# Patient Record
Sex: MALE | Race: WHITE | URBAN - METROPOLITAN AREA
[De-identification: names, ages, dates, MRNs, and addresses within clinical notes are randomized per-mention and may not be internally consistent; named-entity substitution may affect disease eponyms.]

---

## 2017-03-21 DIAGNOSIS — I10 ESSENTIAL HYPERTENSION: ICD-10-CM

## 2017-03-21 RX ORDER — LISINOPRIL 10 MG/1
10 TABLET ORAL DAILY
Qty: 90 TABLET | Refills: 0 | Status: SHIPPED | OUTPATIENT
Start: 2017-03-21 | End: 2017-06-16

## 2017-03-21 NOTE — TELEPHONE ENCOUNTER
lisinopril (PRINIVIL,ZESTRIL) 10 MG tablet      Last Written Prescription Date: 02/11/16  Last Fill Quantity: 90 tab, # refills: prn  Last Office Visit with G, P or [facility] prescribing provider: 06/27/16       Potassium   Date Value Ref Range Status   04/20/2015 4.0 3.4 - 5.3 mmol/L Final     Creatinine   Date Value Ref Range Status   06/27/2016 1.24 0.66 - 1.25 mg/dL Final     BP Readings from Last 3 Encounters:   07/20/16 132/74   06/27/16 120/68   02/11/16 132/70

## 2017-06-16 DIAGNOSIS — I10 ESSENTIAL HYPERTENSION: ICD-10-CM

## 2017-06-19 RX ORDER — LISINOPRIL 10 MG/1
TABLET ORAL
Qty: 30 TABLET | Refills: 0 | Status: SHIPPED | OUTPATIENT
Start: 2017-06-19 | End: 2017-06-30

## 2017-06-19 NOTE — TELEPHONE ENCOUNTER
Lisinopril       Last Written Prescription Date: 3/21/17  Last Fill Quantity: 90, # refills: 0  Last Office Visit with G, P or [facility] prescribing provider: 6/27/16  Next 5 appointments (look out 90 days)     Jun 30, 2017  8:30 AM CDT   PHYSICAL with Montana Silver MD   [facility]    [address] [place] [address] [phone]                   Potassium   Date Value Ref Range Status   04/20/2015 4.0 3.4 - 5.3 mmol/L Final     Creatinine   Date Value Ref Range Status   06/27/2016 1.24 0.66 - 1.25 mg/dL Final     BP Readings from Last 3 Encounters:   07/20/16 132/74   06/27/16 120/68   02/11/16 132/70

## 2017-06-30 ENCOUNTER — OFFICE VISIT (OUTPATIENT)
Dept: INTERNAL MEDICINE | Facility: CLINIC | Age: 77
End: 2017-06-30
Payer: COMMERCIAL

## 2017-06-30 VITALS
WEIGHT: 212 LBS | OXYGEN SATURATION: 95 % | DIASTOLIC BLOOD PRESSURE: 70 MMHG | BODY MASS INDEX: 30.35 KG/M2 | HEART RATE: 72 BPM | RESPIRATION RATE: 16 BRPM | HEIGHT: 70 IN | SYSTOLIC BLOOD PRESSURE: 124 MMHG | TEMPERATURE: 98.3 F

## 2017-06-30 DIAGNOSIS — E66.9 NON MORBID OBESITY, UNSPECIFIED OBESITY TYPE: ICD-10-CM

## 2017-06-30 DIAGNOSIS — J30.9 ALLERGIC RHINITIS, UNSPECIFIED CHRONICITY, UNSPECIFIED SEASONALITY, UNSPECIFIED TRIGGER: ICD-10-CM

## 2017-06-30 DIAGNOSIS — Z00.00 MEDICARE ANNUAL WELLNESS VISIT, SUBSEQUENT: Primary | ICD-10-CM

## 2017-06-30 DIAGNOSIS — E78.5 HYPERLIPIDEMIA LDL GOAL <130: ICD-10-CM

## 2017-06-30 DIAGNOSIS — I10 ESSENTIAL HYPERTENSION: ICD-10-CM

## 2017-06-30 LAB
ANION GAP SERPL CALCULATED.3IONS-SCNC: 7 MMOL/L (ref 3–14)
BUN SERPL-MCNC: 15 MG/DL (ref 7–30)
CALCIUM SERPL-MCNC: 9.5 MG/DL (ref 8.5–10.1)
CHLORIDE SERPL-SCNC: 110 MMOL/L (ref 94–109)
CHOLEST SERPL-MCNC: 174 MG/DL
CO2 SERPL-SCNC: 25 MMOL/L (ref 20–32)
CREAT SERPL-MCNC: 1.23 MG/DL (ref 0.66–1.25)
GFR SERPL CREATININE-BSD FRML MDRD: 57 ML/MIN/1.7M2
GLUCOSE SERPL-MCNC: 99 MG/DL (ref 70–99)
HDLC SERPL-MCNC: 42 MG/DL
LDLC SERPL CALC-MCNC: 113 MG/DL
NONHDLC SERPL-MCNC: 132 MG/DL
POTASSIUM SERPL-SCNC: 4.2 MMOL/L (ref 3.4–5.3)
SODIUM SERPL-SCNC: 142 MMOL/L (ref 133–144)
TRIGL SERPL-MCNC: 97 MG/DL

## 2017-06-30 PROCEDURE — 80061 LIPID PANEL: CPT | Performed by: INTERNAL MEDICINE

## 2017-06-30 PROCEDURE — 80048 BASIC METABOLIC PNL TOTAL CA: CPT | Performed by: INTERNAL MEDICINE

## 2017-06-30 PROCEDURE — 99397 PER PM REEVAL EST PAT 65+ YR: CPT | Performed by: INTERNAL MEDICINE

## 2017-06-30 PROCEDURE — 36415 COLL VENOUS BLD VENIPUNCTURE: CPT | Performed by: INTERNAL MEDICINE

## 2017-06-30 RX ORDER — FLUTICASONE PROPIONATE 50 MCG
2 SPRAY, SUSPENSION (ML) NASAL DAILY PRN
Qty: 16 G | Status: SHIPPED | OUTPATIENT
Start: 2017-06-30 | End: 2018-07-13

## 2017-06-30 RX ORDER — LISINOPRIL 10 MG/1
TABLET ORAL
Qty: 90 TABLET | Refills: 3 | Status: SHIPPED | OUTPATIENT
Start: 2017-06-30 | End: 2018-07-11

## 2017-06-30 NOTE — PATIENT INSTRUCTIONS
Preventive Health Recommendations:       Male Ages 65 and over    Yearly exam:             See your health care provider every year in order to  o   Review health changes.   o   Discuss preventive care.    o   Review your medicines if your doctor has prescribed any.    Talk with your health care provider about whether you should have a test to screen for prostate cancer (PSA).    Every 3 years, have a diabetes test (fasting glucose). If you are at risk for diabetes, you should have this test more often.    Every 5 years, have a cholesterol test. Have this test more often if you are at risk for high cholesterol or heart disease.     Every 10 years, have a colonoscopy. Or, have a yearly FIT test (stool test). These exams will check for colon cancer.    Talk to with your health care provider about screening for Abdominal Aortic Aneurysm if you have a family history of AAA or have a history of smoking.  Shots:     Get a flu shot each year.     Get a tetanus shot every 10 years.     Talk to your doctor about your pneumonia vaccines. There are now two you should receive - Pneumovax (PPSV 23) and Prevnar (PCV 13).    Talk to your doctor about a shingles vaccine.     Talk to your doctor about the hepatitis B vaccine.  Nutrition:     Eat at least 5 servings of fruits and vegetables each day.     Eat whole-grain bread, whole-wheat pasta and brown rice instead of white grains and rice.     Talk to your doctor about Calcium and Vitamin D.   Lifestyle    Exercise for at least 150 minutes a week (30 minutes a day, 5 days a week). This will help you control your weight and prevent disease.     Limit alcohol to one drink per day.     No smoking.     Wear sunscreen to prevent skin cancer.     See your dentist every six months for an exam and cleaning.     See your eye doctor every 1 to 2 years to screen for conditions such as glaucoma, macular degeneration and cataracts.    PLAN:  1.  Continue present medications   2.  Continue  weight management, blood pressure checks  3.  Check labs

## 2017-06-30 NOTE — MR AVS SNAPSHOT
After Visit Summary   6/30/2017    Nirav Queen    MRN: [id number]           Patient Information     Date Of Birth          1940        Visit Information        Provider Department      6/30/2017 8:30 AM Montana Silver MD [facility]        Today's Diagnoses     Medicare annual wellness visit, subsequent    -  1    Essential hypertension        Allergic rhinitis, unspecified chronicity, unspecified seasonality, unspecified trigger        Hyperlipidemia LDL goal <130        Obesity          Care Instructions      Preventive Health Recommendations:       Male Ages 65 and over    Yearly exam:             See your health care provider every year in order to  o   Review health changes.   o   Discuss preventive care.    o   Review your medicines if your doctor has prescribed any.    Talk with your health care provider about whether you should have a test to screen for prostate cancer (PSA).    Every 3 years, have a diabetes test (fasting glucose). If you are at risk for diabetes, you should have this test more often.    Every 5 years, have a cholesterol test. Have this test more often if you are at risk for high cholesterol or heart disease.     Every 10 years, have a colonoscopy. Or, have a yearly FIT test (stool test). These exams will check for colon cancer.    Talk to with your health care provider about screening for Abdominal Aortic Aneurysm if you have a family history of AAA or have a history of smoking.  Shots:     Get a flu shot each year.     Get a tetanus shot every 10 years.     Talk to your doctor about your pneumonia vaccines. There are now two you should receive - Pneumovax (PPSV 23) and Prevnar (PCV 13).    Talk to your doctor about a shingles vaccine.     Talk to your doctor about the hepatitis B vaccine.  Nutrition:     Eat at least 5 servings of fruits and vegetables each day.     Eat whole-grain bread, whole-wheat pasta and brown rice instead of white  grains and rice.     Talk to your doctor about Calcium and Vitamin D.   Lifestyle    Exercise for at least 150 minutes a week (30 minutes a day, 5 days a week). This will help you control your weight and prevent disease.     Limit alcohol to one drink per day.     No smoking.     Wear sunscreen to prevent skin cancer.     See your dentist every six months for an exam and cleaning.     See your eye doctor every 1 to 2 years to screen for conditions such as glaucoma, macular degeneration and cataracts.    PLAN:  1.  Continue present medications   2.  Continue weight management, blood pressure checks  3.  Check labs           Follow-ups after your visit        Who to contact     If you have questions or need follow up information about today's clinic visit or your schedule please contact [facility] directly at [phone].  Normal or non-critical lab and imaging results will be communicated to you by MyChart, letter or phone within 4 business days after the clinic has received the results. If you do not hear from us within 7 days, please contact the clinic through [employer] or phone. If you have a critical or abnormal lab result, we will notify you by phone as soon as possible.  Submit refill requests through [employer] or call your pharmacy and they will forward the refill request to us. Please allow 3 business days for your refill to be completed.          Additional Information About Your Visit        [employer] Information     [employer] gives you secure access to your electronic health record. If you see a primary care provider, you can also send messages to your care team and make appointments. If you have questions, please call your primary care clinic.  If you do not have a primary care provider, please call [phone] and they will assist you.        Care EveryWhere ID     This is your Care EveryWhere ID. This could be used by other organizations to access your [facility]  "records  [id number]        Your Vitals Were     Pulse Temperature Respirations Height Pulse Oximetry BMI (Body Mass Index)    72 98.3  F (36.8  C) (Oral) 16 5' 10\" (1.778 m) 95% 30.42 kg/m2       Blood Pressure from Last 3 Encounters:   06/30/17 124/70   07/20/16 132/74   06/27/16 120/68    Weight from Last 3 Encounters:   06/30/17 212 lb (96.2 kg)   07/20/16 212 lb 4.8 oz (96.3 kg)   06/27/16 211 lb 12.8 oz (96.1 kg)              We Performed the Following     Basic metabolic panel     Lipid panel reflex to direct LDL          Today's Medication Changes          These changes are accurate as of: 6/30/17  9:39 AM.  If you have any questions, ask your nurse or doctor.               These medicines have changed or have updated prescriptions.        Dose/Directions    fluticasone 50 MCG/ACT spray   Commonly known as:  FLONASE   This may have changed:    - when to take this  - reasons to take this   Used for:  Allergic rhinitis, unspecified chronicity, unspecified seasonality, unspecified trigger   Changed by:  Montana Silver MD        Dose:  2 spray   Spray 2 sprays into both nostrils daily as needed for rhinitis or allergies   Quantity:  16 g   Refills:  prn       lisinopril 10 MG tablet   Commonly known as:  PRINIVIL/ZESTRIL   This may have changed:  See the new instructions.   Used for:  Essential hypertension   Changed by:  Montana Silver MD        TAKE 1 TABLET(10 MG) BY MOUTH DAILY   Quantity:  90 tablet   Refills:  3            Where to get your medicines      These medications were sent to [facility] [address] [facility] [address] AT [facility] [address], [facility] [address]    Hours:  24-hours Phone:  [phone]     fluticasone 50 MCG/ACT spray    lisinopril 10 MG tablet                Primary Care Provider Office Phone # Fax #    Montana Silver [phone]       [facility] [address] [facility] [address]      "   Equal Access to Services     [facility] Hospital[facility]AMER[facility] : Hadii ramesh henry chelo [place], wamadisynda luqadaha, qaybta kajamarbenito saenz, pepe garcia. So [facility] [phone].    ATENCIÓN: Si santosla farheen, tiene a comer disposición servicios gratuitos de asistencia lingüística. Bellaame al [phone].    We comply with applicable federal civil rights laws and [place] laws. We do not discriminate on the basis of race, color, national origin, age, disability sex, sexual orientation or gender identity.            Thank you!     Thank you for choosing [facility]  for your care. Our goal is always to provide you with excellent care. Hearing back from our patients is one way we can continue to improve our services. Please take a few minutes to complete the written survey that you may receive in the mail after your visit with us. Thank you!             Your Updated Medication List - Protect others around you: Learn how to safely use, store and throw away your medicines at www.disposemymeds.org.          This list is accurate as of: 6/30/17  9:39 AM.  Always use your most recent med list.                   Brand Name Dispense Instructions for use Diagnosis    aspirin 81 MG tablet      Take 1 tablet by mouth daily.        cyabnocobalamin 2500 MCG sublingual tablet    VITAMIN B-12    90 tablet    Place 2,500 mcg under the tongue daily    Paresthesias       fluticasone 50 MCG/ACT spray    FLONASE    16 g    Spray 2 sprays into both nostrils daily as needed for rhinitis or allergies    Allergic rhinitis, unspecified chronicity, unspecified seasonality, unspecified trigger       lisinopril 10 MG tablet    PRINIVIL/ZESTRIL    90 tablet    TAKE 1 TABLET(10 MG) BY MOUTH DAILY    Essential hypertension       METROGEL 1 % gel   Generic drug:  metroNIDAZOLE     1 Tube    Apply twice daily to affected areas for rosacea    Rosacea

## 2017-06-30 NOTE — NURSING NOTE
"Chief Complaint   Patient presents with     Physical       Initial /70  Pulse 72  Temp 98.3  F (36.8  C) (Oral)  Resp 16  Ht 5' 10\" (1.778 m)  Wt 212 lb (96.2 kg)  SpO2 95%  BMI 30.42 kg/m2 Estimated body mass index is 30.42 kg/(m^2) as calculated from the following:    Height as of this encounter: 5' 10\" (1.778 m).    Weight as of this encounter: 212 lb (96.2 kg).  Medication Reconciliation: complete   Janette Ricci MA   "

## 2017-06-30 NOTE — PROGRESS NOTES
SUBJECTIVE:   Nirav Queen is a 77 year old male who presents for Preventive Visit.    Are you in the first 12 months of your Medicare Part B coverage?  No    Healthy Habits:    Do you get at least three servings of calcium containing foods daily (dairy, green leafy vegetables, etc.)? No, only 2     Amount of exercise or daily activities, outside of work: 5 day(s) per week    Problems taking medications regularly No    Medication side effects: No    Have you had an eye exam in the past two years? yes    Do you see a dentist twice per year? yes    Do you have sleep apnea, excessive snoring or daytime drowsiness?no    COGNITIVE SCREEN  1) Repeat 3 items (Banana, Sunrise, Chair)    2) Clock draw: NORMAL  3) 3 item recall: Recalls 3 objects  Results: 3 items recalled: COGNITIVE IMPAIRMENT LESS LIKELY    Mini-CogTM Copyright S Mason. Licensed by the author for use in [facility] [employer]; reprinted with permission (darlyn@[facility]). All rights reserved.      Additional issues to address:  1.  Follow-up HTN.  Patient is not checking outpatient blood pressures often.  He reports no side effects from BP medications.   2.  Paresthesias in feet are absent, continues on B12       Reviewed and updated as needed this visit by clinical staff         Reviewed and updated as needed this visit by Provider        Social History   Substance Use Topics     Smoking status: Never Smoker     Smokeless tobacco: Never Used     Alcohol use Yes      Comment: wine-- 1-2 glasses a week or less       The patient does not drink >3 drinks per day nor >7 drinks per week.    Today's PHQ-2 Score:   PHQ-2 ( 1999 Pfizer) 6/27/2016 6/24/2016   Q1: Little interest or pleasure in doing things 0 -   Q2: Feeling down, depressed or hopeless 0 -   PHQ-2 Score 0 -   Q1: Little interest or pleasure in doing things - Not at all   Q2: Feeling down, depressed or hopeless - Not at all   PHQ-2 Score - 0       Do you feel safe in your environment - Yes    Do  "you have a Health Care Directive?: No: Advance care planning reviewed with patient; information given to patient to review.    Current providers sharing in care for this patient include:   Patient Care Team:  Montana Silver MD as PCP - General      Hearing impairment: Yes, Need to ask people to speak up or repeat themselves.    Ability to successfully perform activities of daily living: Yes, no assistance needed     Fall risk:  Fallen 2 or more times in the past year?: No  Any fall with injury in the past year?: No    Home safety:  none identified      The following health maintenance items are reviewed in Epic and correct as of today:  Health Maintenance   Topic Date Due     AORTIC ANEURYSM SCREENING (SYSTEM ASSIGNED)  04/05/2007     LIPID MONITORING Q6 MO  10/20/2015     CREATININE Q1 YEAR  06/27/2017     FALL RISK ASSESSMENT  06/27/2017     INFLUENZA VACCINE (SYSTEM ASSIGNED)  09/01/2017     COLONOSCOPY Q3 YR  05/13/2018     ADVANCE DIRECTIVE PLANNING Q5 YRS  04/07/2019     TETANUS IMMUNIZATION (SYSTEM ASSIGNED)  06/27/2026     PNEUMOCOCCAL  Completed       ROS:  Constitutional, HEENT, cardiovascular, pulmonary, gi and gu systems are negative, except as otherwise noted.   Erections are not as strong.  Can have mild orthostatic symptoms after bending over.       OBJECTIVE:   /70  Pulse 72  Temp 98.3  F (36.8  C) (Oral)  Resp 16  Ht 5' 10\" (1.778 m)  Wt 212 lb (96.2 kg)  SpO2 95%  BMI 30.42 kg/m2 Estimated body mass index is 30.46 kg/(m^2) as calculated from the following:    Height as of 6/27/16: 5' 10\" (1.778 m).    Weight as of 7/20/16: 212 lb 4.8 oz (96.3 kg).  EXAM:   GENERAL: healthy, alert and no distress  NECK: no adenopathy, no asymmetry, masses, or scars and thyroid normal to palpation  RESP: lungs clear to auscultation - no rales, rhonchi or wheezes  CV: regular rate and rhythm, normal S1 S2, no S3 or S4, no murmur, click or rub, no peripheral edema and peripheral pulses strong  ABDOMEN: " "soft, nontender, no hepatosplenomegaly, no masses and bowel sounds normal  MS: no gross musculoskeletal defects noted, no edema  SKIN: mild rosacea nose    ASSESSMENT / PLAN:     ASSESSMENT:   1.  Annual Wellness Visit  2.  HTN, controlled.   Discussed possibility of cutting lisinopril to 5 mg daily, and checking blood pressure more often  3.  Follow-up hyperlipidemia, diet control      PLAN:  1.  Continue present medications   2.  Continue weight management, blood pressure checks  3.  Check labs       End of Life Planning:  Patient currently has an advanced directive: Yes.  Practitioner is supportive of decision.    COUNSELING:  Reviewed preventive health counseling, as reflected in patient instructions      Estimated body mass index is 30.46 kg/(m^2) as calculated from the following:    Height as of 6/27/16: 5' 10\" (1.778 m).    Weight as of 7/20/16: 212 lb 4.8 oz (96.3 kg).  Weight management plan: Discussed healthy diet and exercise guidelines and patient will follow up in 12 months in clinic to re-evaluate.   reports that he has never smoked. He has never used smokeless tobacco.      Appropriate preventive services were discussed with this patient, including applicable screening as appropriate for cardiovascular disease, diabetes, osteopenia/osteoporosis, and glaucoma.  As appropriate for age/gender, discussed screening for colorectal cancer, prostate cancer, breast cancer, and cervical cancer. Checklist reviewing preventive services available has been given to the patient.    Reviewed patients plan of care and provided an AVS. The Basic Care Plan (routine screening as documented in Health Maintenance) for Nirav meets the Care Plan requirement. This Care Plan has been established and reviewed with the Patient.    Counseling Resources:  ATP IV Guidelines  Pooled Cohorts Equation Calculator  Breast Cancer Risk Calculator  FRAX Risk Assessment  ICSI Preventive Guidelines  Dietary Guidelines for Americans, " 2010  USDA's MyPlate  ASA Prophylaxis  Lung CA Screening    Montana Silver MD  [facility]

## 2017-07-16 DIAGNOSIS — I10 ESSENTIAL HYPERTENSION: ICD-10-CM

## 2017-07-17 NOTE — TELEPHONE ENCOUNTER
lisinopril (PRINIVIL/ZESTRIL) 10 MG tablet      Last Written Prescription Date: 6/30/17  Last Fill Quantity: 90, # refills: 3  Last Office Visit with G, P or [facility] prescribing provider: 6/30/17       Potassium   Date Value Ref Range Status   06/30/2017 4.2 3.4 - 5.3 mmol/L Final     Creatinine   Date Value Ref Range Status   06/30/2017 1.23 0.66 - 1.25 mg/dL Final     BP Readings from Last 3 Encounters:   06/30/17 124/70   07/20/16 132/74   06/27/16 120/68

## 2017-07-18 RX ORDER — LISINOPRIL 10 MG/1
TABLET ORAL
Qty: 30 TABLET | Refills: 0 | OUTPATIENT
Start: 2017-07-18

## 2017-08-06 DIAGNOSIS — J30.9 ALLERGIC RHINITIS: ICD-10-CM

## 2017-08-07 RX ORDER — FLUTICASONE PROPIONATE 50 MCG
SPRAY, SUSPENSION (ML) NASAL
Qty: 16 ML | Refills: 0 | OUTPATIENT
Start: 2017-08-07

## 2017-08-10 DIAGNOSIS — R20.2 PARESTHESIAS: ICD-10-CM

## 2018-05-12 DIAGNOSIS — R20.2 PARESTHESIAS: ICD-10-CM

## 2018-05-13 NOTE — TELEPHONE ENCOUNTER
"Requested Prescriptions   Pending Prescriptions Disp Refills     cyanocobalamin 2500 MCG SUBL sublingual tablet [Pharmacy Med Name: VITAMIN B-12 2500MCG SUBL TABS 50'S]  Last Written Prescription Date:  08/10/2017  Last Fill Quantity: 90,  # refills: 2   Last office visit: 6/30/2017 with prescribing provider:  KASHMIR    Future Office Visit:   Next 5 appointments (look out 90 days)     Jul 13, 2018  9:00 AM CDT   PHYSICAL with Montana Silver MD   [facility]    [address] [place] [address] [phone]                  90 tablet 0     Sig: PLACE 1 TABLET UNDER THE TONGUE DAILY    Vitamin Supplements (Adult) Protocol Passed    5/12/2018  3:13 AM       Passed - High dose Vitamin D not ordered       Passed - Recent (12 mo) or future (30 days) visit within the authorizing provider's specialty    Patient had office visit in the last 12 months or has a visit in the next 30 days with authorizing provider or within the authorizing provider's specialty.  See \"Patient Info\" tab in inbasket, or \"Choose Columns\" in Meds & Orders section of the refill encounter.              "

## 2018-07-10 ASSESSMENT — ACTIVITIES OF DAILY LIVING (ADL)
CURRENT_FUNCTION: NO ASSISTANCE NEEDED
I_NEED_ASSISTANCE_FOR_THE_FOLLOWING_DAILY_ACTIVITIES:: NO ASSISTANCE IS NEEDED

## 2018-07-11 DIAGNOSIS — I10 ESSENTIAL HYPERTENSION: ICD-10-CM

## 2018-07-11 RX ORDER — LISINOPRIL 10 MG/1
TABLET ORAL
Qty: 90 TABLET | Refills: 0 | OUTPATIENT
Start: 2018-07-11

## 2018-07-11 RX ORDER — LISINOPRIL 10 MG/1
TABLET ORAL
Qty: 30 TABLET | Refills: 0 | Status: SHIPPED | OUTPATIENT
Start: 2018-07-11 | End: 2018-07-13

## 2018-07-11 NOTE — LETTER
[facility]  [address] [id number] [phone]            Nirav Queen  [address]  [facility] [address]        July 11, 2018    Dear Nirav,    While refilling your prescription today, we noticed that you are due for an appointment with your provider.  We will refill your prescription for 30 days, but a follow-up appointment must be made before any additional refills can be approved.     Taking care of your health is important to us and we look forward to seeing you in the near future.  Please call us at [phone] or [phone] (or use [employer]) to schedule an appointment.     Please disregard this notice if you have already made an appointment.    Sincerely,        [facility]

## 2018-07-11 NOTE — TELEPHONE ENCOUNTER
"Requested Prescriptions   Pending Prescriptions Disp Refills     lisinopril (PRINIVIL/ZESTRIL) 10 MG tablet 90 tablet 3     Sig: TAKE 1 TABLET(10 MG) BY MOUTH DAILY    ACE Inhibitors (Including Combos) Protocol Failed    7/11/2018 10:42 AM       Failed - Blood pressure under 140/90 in past 12 months    BP Readings from Last 3 Encounters:   06/30/17 124/70   07/20/16 132/74   06/27/16 120/68                Failed - Normal serum creatinine on file in past 12 months    Recent Labs   Lab Test  06/30/17   0949   CR  1.23            Failed - Normal serum potassium on file in past 12 months    Recent Labs   Lab Test  06/30/17   0949   POTASSIUM  4.2            Passed - Recent (12 mo) or future (30 days) visit within the authorizing provider's specialty    Patient had office visit in the last 12 months or has a visit in the next 30 days with authorizing provider or within the authorizing provider's specialty.  See \"Patient Info\" tab in inbasket, or \"Choose Columns\" in Meds & Orders section of the refill encounter.           Passed - Patient is age 18 or older        Prescription approved per [facility] Refill Protocol.    "

## 2018-07-11 NOTE — TELEPHONE ENCOUNTER
Requested Prescriptions   Pending Prescriptions Disp Refills     lisinopril (PRINIVIL/ZESTRIL) 10 MG tablet 90 tablet 3     Sig: TAKE 1 TABLET(10 MG) BY MOUTH DAILY    There is no refill protocol information for this order        Last Written Prescription Date:  6/30/17  Last Fill Quantity: 90,  # refills: 3   Last office visit: 6/30/2017 with prescribing provider:  6/30/17   Future Office Visit:   Next 5 appointments (look out 90 days)     Jul 13, 2018  9:00 AM CDT   PHYSICAL with Montana Silver MD   [facility]    [address] [address] [phone]

## 2018-07-11 NOTE — TELEPHONE ENCOUNTER
"Requested Prescriptions   Pending Prescriptions Disp Refills     lisinopril (PRINIVIL/ZESTRIL) 10 MG tablet [Pharmacy Med Name: LISINOPRIL 10MG TABLETS] 90 tablet 0     Sig: TAKE 1 TABLET BY MOUTH EVERY DAY    ACE Inhibitors (Including Combos) Protocol Failed    7/11/2018 11:40 AM       Failed - Blood pressure under 140/90 in past 12 months    BP Readings from Last 3 Encounters:   06/30/17 124/70   07/20/16 132/74   06/27/16 120/68                Failed - Normal serum creatinine on file in past 12 months    Recent Labs   Lab Test  06/30/17   0949   CR  1.23            Failed - Normal serum potassium on file in past 12 months    Recent Labs   Lab Test  06/30/17   0949   POTASSIUM  4.2            Passed - Recent (12 mo) or future (30 days) visit within the authorizing provider's specialty    Patient had office visit in the last 12 months or has a visit in the next 30 days with authorizing provider or within the authorizing provider's specialty.  See \"Patient Info\" tab in inbasket, or \"Choose Columns\" in Meds & Orders section of the refill encounter.           Passed - Patient is age 18 or older          "

## 2018-07-13 ENCOUNTER — OFFICE VISIT (OUTPATIENT)
Dept: INTERNAL MEDICINE | Facility: CLINIC | Age: 78
End: 2018-07-13
Payer: COMMERCIAL

## 2018-07-13 VITALS
RESPIRATION RATE: 16 BRPM | HEART RATE: 65 BPM | WEIGHT: 212.2 LBS | TEMPERATURE: 98.2 F | DIASTOLIC BLOOD PRESSURE: 70 MMHG | SYSTOLIC BLOOD PRESSURE: 120 MMHG | OXYGEN SATURATION: 98 % | BODY MASS INDEX: 30.45 KG/M2

## 2018-07-13 DIAGNOSIS — I10 ESSENTIAL HYPERTENSION: ICD-10-CM

## 2018-07-13 DIAGNOSIS — E78.5 HYPERLIPIDEMIA LDL GOAL <130: ICD-10-CM

## 2018-07-13 DIAGNOSIS — Z00.00 MEDICARE ANNUAL WELLNESS VISIT, SUBSEQUENT: Primary | ICD-10-CM

## 2018-07-13 DIAGNOSIS — Z12.11 SCREEN FOR COLON CANCER: ICD-10-CM

## 2018-07-13 DIAGNOSIS — L71.9 ROSACEA: ICD-10-CM

## 2018-07-13 LAB
ANION GAP SERPL CALCULATED.3IONS-SCNC: 6 MMOL/L (ref 3–14)
BUN SERPL-MCNC: 20 MG/DL (ref 7–30)
CALCIUM SERPL-MCNC: 9 MG/DL (ref 8.5–10.1)
CHLORIDE SERPL-SCNC: 109 MMOL/L (ref 94–109)
CO2 SERPL-SCNC: 25 MMOL/L (ref 20–32)
CREAT SERPL-MCNC: 1.29 MG/DL (ref 0.66–1.25)
GFR SERPL CREATININE-BSD FRML MDRD: 54 ML/MIN/1.7M2
GLUCOSE SERPL-MCNC: 102 MG/DL (ref 70–99)
POTASSIUM SERPL-SCNC: 4.6 MMOL/L (ref 3.4–5.3)
SODIUM SERPL-SCNC: 140 MMOL/L (ref 133–144)

## 2018-07-13 PROCEDURE — 99397 PER PM REEVAL EST PAT 65+ YR: CPT | Performed by: INTERNAL MEDICINE

## 2018-07-13 PROCEDURE — 36415 COLL VENOUS BLD VENIPUNCTURE: CPT | Performed by: INTERNAL MEDICINE

## 2018-07-13 PROCEDURE — 80048 BASIC METABOLIC PNL TOTAL CA: CPT | Performed by: INTERNAL MEDICINE

## 2018-07-13 PROCEDURE — 93000 ELECTROCARDIOGRAM COMPLETE: CPT | Performed by: INTERNAL MEDICINE

## 2018-07-13 RX ORDER — METRONIDAZOLE 10 MG/G
GEL TOPICAL DAILY
Qty: 60 G | Refills: 0 | Status: SHIPPED | OUTPATIENT
Start: 2018-07-13

## 2018-07-13 RX ORDER — LISINOPRIL 10 MG/1
TABLET ORAL
Qty: 90 TABLET | Refills: 3 | Status: SHIPPED | OUTPATIENT
Start: 2018-07-13 | End: 2019-08-06

## 2018-07-13 ASSESSMENT — ACTIVITIES OF DAILY LIVING (ADL): CURRENT_FUNCTION: NO ASSISTANCE NEEDED

## 2018-07-13 NOTE — MR AVS SNAPSHOT
After Visit Summary   7/13/2018    Nirav Queen    MRN: [id number]           Patient Information     Date Of Birth          1940        Visit Information        Provider Department      7/13/2018 9:00 AM Montana Silver MD [facility]        Today's Diagnoses     Medicare annual wellness visit, subsequent    -  1    Screen for colon cancer        Hyperlipidemia LDL goal <130        Essential hypertension        Rosacea          Care Instructions      Preventive Health Recommendations:       Male Ages 65 and over    Yearly exam:             See your health care provider every year in order to  o   Review health changes.   o   Discuss preventive care.    o   Review your medicines if your doctor has prescribed any.    Talk with your health care provider about whether you should have a test to screen for prostate cancer (PSA).    Every 3 years, have a diabetes test (fasting glucose). If you are at risk for diabetes, you should have this test more often.    Every 5 years, have a cholesterol test. Have this test more often if you are at risk for high cholesterol or heart disease.     Every 10 years, have a colonoscopy. Or, have a yearly FIT test (stool test). These exams will check for colon cancer.    Talk to with your health care provider about screening for Abdominal Aortic Aneurysm if you have a family history of AAA or have a history of smoking.  Shots:     Get a flu shot each year.     Get a tetanus shot every 10 years.     Talk to your doctor about your pneumonia vaccines. There are now two you should receive - Pneumovax (PPSV 23) and Prevnar (PCV 13).    Talk to your pharmacist about a shingles vaccine.     Talk to your doctor about the hepatitis B vaccine.  Nutrition:     Eat at least 5 servings of fruits and vegetables each day.     Eat whole-grain bread, whole-wheat pasta and brown rice instead of white grains and rice.     Get adequate Calcium and Vitamin D.    Lifestyle    Exercise for at least 150 minutes a week (30 minutes a day, 5 days a week). This will help you control your weight and prevent disease.     Limit alcohol to one drink per day.     No smoking.     Wear sunscreen to prevent skin cancer.     See your dentist every six months for an exam and cleaning.     See your eye doctor every 1 to 2 years to screen for conditions such as glaucoma, macular degeneration and cataracts.    PLAN:  1.  Continue present medications   2.  Check basic metabolic panel   3.  Schedule colonoscopy.   [facility]  [phone]            Follow-ups after your visit        Additional Services     GASTROENTEROLOGY ADULT REF PROCEDURE ONLY ([facility])       Last Lab Result: Creatinine (mg/dL)       Date                     Value                 06/30/2017               1.23             ----------  There is no height or weight on file to calculate BMI.     Needed:  No  Language:  English    Patient will be contacted to schedule procedure.     Please be aware that coverage of these services is subject to the terms and limitations of your health insurance plan.  Call member services at your health plan with any benefit or coverage questions.  Any procedures must be performed at a [facility] facility OR coordinated by your clinic's referral office.    Please bring the following with you to your appointment:    (1) Any X-Rays, CTs or MRIs which have been performed.  Contact the facility where they were done to arrange for  prior to your scheduled appointment.    (2) List of current medications   (3) This referral request   (4) Any documents/labs given to you for this referral                  Who to contact     If you have questions or need follow up information about today's clinic visit or your schedule please contact [facility] directly at [phone].  Normal or non-critical lab and imaging results will be communicated to you by  MyChart, letter or phone within 4 business days after the clinic has received the results. If you do not hear from us within 7 days, please contact the clinic through [employer] or phone. If you have a critical or abnormal lab result, we will notify you by phone as soon as possible.  Submit refill requests through [employer] or call your pharmacy and they will forward the refill request to us. Please allow 3 business days for your refill to be completed.          Additional Information About Your Visit        [employer] Information     [employer] gives you secure access to your electronic health record. If you see a primary care provider, you can also send messages to your care team and make appointments. If you have questions, please call your primary care clinic.  If you do not have a primary care provider, please call [phone] and they will assist you.        Care EveryWhere ID     This is your Care EveryWhere ID. This could be used by other organizations to access your [place] medical records  [id number]        Your Vitals Were     Pulse Temperature Respirations Pulse Oximetry BMI (Body Mass Index)       65 98.2  F (36.8  C) (Oral) 16 98% 30.45 kg/m2        Blood Pressure from Last 3 Encounters:   07/13/18 120/70   06/30/17 124/70   07/20/16 132/74    Weight from Last 3 Encounters:   07/13/18 212 lb 3.2 oz (96.3 kg)   06/30/17 212 lb (96.2 kg)   07/20/16 212 lb 4.8 oz (96.3 kg)              We Performed the Following     Basic metabolic panel     EKG 12-lead complete w/read - Clinics     GASTROENTEROLOGY ADULT REF PROCEDURE ONLY ([facility])          Today's Medication Changes          These changes are accurate as of 7/13/18 10:20 AM.  If you have any questions, ask your nurse or doctor.               These medicines have changed or have updated prescriptions.        Dose/Directions    metroNIDAZOLE 1 % gel   Commonly known as:  METROGEL   This may have changed:  See the new instructions.   Used for:  Rosacea   Changed  by:  Montana Silver MD        Apply topically daily   Quantity:  60 g   Refills:  0         Stop taking these medicines if you haven't already. Please contact your care team if you have questions.     fluticasone 50 MCG/ACT spray   Commonly known as:  FLONASE   Stopped by:  Montana Silver MD                Where to get your medicines      These medications were sent to [facility] [address] [place] [address] [address] AT [facility] [address], [facility] [address]     Phone:  [phone]     lisinopril 10 MG tablet    metroNIDAZOLE 1 % gel                Primary Care Provider Office Phone # Fax #    Montana Silver [phone]       [address] [facility] [address]        Equal Access to Services     [facility]: Hadii aad ku hadasho Soomaali, waaxda luqadaha, qaybta kaalmada adeegyada, waxay idiin hayaan adecarito kharash maranda . So [facility] [phone].    ATENCIÓN: Si habla español, tiene a comer disposición servicios gratuitos de asistencia lingüística. [facility] [phone].    We comply with applicable federal civil rights laws and [place] laws. We do not discriminate on the basis of race, color, national origin, age, disability, sex, sexual orientation, or gender identity.            Thank you!     Thank you for choosing [facility]  for your care. Our goal is always to provide you with excellent care. Hearing back from our patients is one way we can continue to improve our services. Please take a few minutes to complete the written survey that you may receive in the mail after your visit with us. Thank you!             Your Updated Medication List - Protect others around you: Learn how to safely use, store and throw away your medicines at www.disposemymeds.org.          This list is accurate as of 7/13/18 10:20 AM.  Always use your most recent med list.                   Brand Name Dispense Instructions for use  Diagnosis    aspirin 81 MG tablet      Take 1 tablet by mouth daily.        cyanocobalamin 2500 MCG Subl sublingual tablet     90 tablet    PLACE 1 TABLET UNDER THE TONGUE DAILY    Paresthesias       lisinopril 10 MG tablet    PRINIVIL/ZESTRIL    90 tablet    TAKE 1 TABLET(10 MG) BY MOUTH DAILY    Essential hypertension       metroNIDAZOLE 1 % gel    METROGEL    60 g    Apply topically daily    Rosacea

## 2018-07-13 NOTE — PATIENT INSTRUCTIONS
Preventive Health Recommendations:       Male Ages 65 and over    Yearly exam:             See your health care provider every year in order to  o   Review health changes.   o   Discuss preventive care.    o   Review your medicines if your doctor has prescribed any.    Talk with your health care provider about whether you should have a test to screen for prostate cancer (PSA).    Every 3 years, have a diabetes test (fasting glucose). If you are at risk for diabetes, you should have this test more often.    Every 5 years, have a cholesterol test. Have this test more often if you are at risk for high cholesterol or heart disease.     Every 10 years, have a colonoscopy. Or, have a yearly FIT test (stool test). These exams will check for colon cancer.    Talk to with your health care provider about screening for Abdominal Aortic Aneurysm if you have a family history of AAA or have a history of smoking.  Shots:     Get a flu shot each year.     Get a tetanus shot every 10 years.     Talk to your doctor about your pneumonia vaccines. There are now two you should receive - Pneumovax (PPSV 23) and Prevnar (PCV 13).    Talk to your pharmacist about a shingles vaccine.     Talk to your doctor about the hepatitis B vaccine.  Nutrition:     Eat at least 5 servings of fruits and vegetables each day.     Eat whole-grain bread, whole-wheat pasta and brown rice instead of white grains and rice.     Get adequate Calcium and Vitamin D.   Lifestyle    Exercise for at least 150 minutes a week (30 minutes a day, 5 days a week). This will help you control your weight and prevent disease.     Limit alcohol to one drink per day.     No smoking.     Wear sunscreen to prevent skin cancer.     See your dentist every six months for an exam and cleaning.     See your eye doctor every 1 to 2 years to screen for conditions such as glaucoma, macular degeneration and cataracts.    PLAN:  1.  Continue present medications   2.  Check basic  metabolic panel   3.  Schedule colonoscopy.   [facility]  [phone]

## 2018-07-13 NOTE — PROGRESS NOTES
SUBJECTIVE:   Nirav Queen is a 78 year old male who presents for Preventive Visit.    Are you in the first 12 months of your Medicare coverage?  No    Physical   Annual:     Getting at least 3 servings of Calcium per day:  NO    Bi-annual eye exam:  Yes    Dental care twice a year:  Yes    Sleep apnea or symptoms of sleep apnea:  None    Diet:  Regular (no restrictions)    Frequency of exercise:  4-5 days/week    Duration of exercise:  45-60 minutes    Taking medications regularly:  Yes    Medication side effects:  None    Additional concerns today:  No    Ability to successfully perform activities of daily living: no assistance needed    Home Safety:  Lack of grab bars in the bathroom    Hearing Impairment: difficulty following a conversation in a noisy restaurant or crowded room, difficult to understand a speaker at a public meeting or Jainism service, find that men's voices are easier to understand than woman's and difficulty understanding soft or whispered speech      Ability to successfully perform activities of daily living: Yes, no assistance needed  Home safety:  none identified   Hearing impairment: Yes, Need to ask people to speak up or repeat themselves.    Fall risk:  Fallen 2 or more times in the past year?: No  Any fall with injury in the past year?: No    COGNITIVE SCREEN  1) Repeat 3 items (Leader, Season, Table)    2) Clock draw: NORMAL  3) 3 item recall: Recalls 3 objects  Results: 3 items recalled: COGNITIVE IMPAIRMENT LESS LIKELY    Mini-CogTM Copyright WARD Cuevas. Licensed by the author for use in [facility]; reprinted with permission (darlyn@.[facility]). All rights reserved.        Reviewed and updated as needed this visit by clinical staff         Reviewed and updated as needed this visit by Provider        Social History   Substance Use Topics     Smoking status: Never Smoker     Smokeless tobacco: Never Used     Alcohol use Yes      Comment: wine-- 1-2 glasses a week or less  "      Alcohol Use 7/10/2018   If you drink alcohol do you typically have greater than 3 drinks per day OR greater than 7 drinks per week? No       Additional issues to address:  1.  Pt would like a skin check, has a lesion on right cheek.   Rosacea managed with udder cream, doing OK.    2.  Follow-up HTN.  Patient is not checking outpatient blood pressures.  He reports rare orthostatic lightheadedness, no other side effects from BP medications.       Today's PHQ-2 Score:   PHQ-2 ( 1999 [employer]) 7/10/2018   Q1: Little interest or pleasure in doing things 0   Q2: Feeling down, depressed or hopeless 0   PHQ-2 Score 0   Q1: Little interest or pleasure in doing things Not at all   Q2: Feeling down, depressed or hopeless Not at all   PHQ-2 Score 0       Do you feel safe in your environment - Yes    Do you have a Health Care Directive?: No: Advance care planning reviewed with patient; information given to patient to review.    Current providers sharing in care for this patient include:   Patient Care Team:  Montana Silver MD as PCP - General    The following health maintenance items are reviewed in Epic and correct as of today:  Health Maintenance   Topic Date Due     COLONOSCOPY Q3 YR  05/13/2018     CREATININE Q1 YEAR  06/30/2018     LIPID MONITORING Q1 YEAR  06/30/2018     FALL RISK ASSESSMENT  06/30/2018     INFLUENZA VACCINE (1) 09/01/2018     ADVANCE DIRECTIVE PLANNING Q5 YRS  04/07/2019     PHQ-2 Q1 YR  07/13/2019     TETANUS IMMUNIZATION (SYSTEM ASSIGNED)  06/27/2026     PNEUMOCOCCAL  Completed         Review of Systems  Constitutional, HEENT, cardiovascular, pulmonary, gi and gu systems are negative, except as otherwise noted.    OBJECTIVE:   /70  Pulse 65  Temp 98.2  F (36.8  C) (Oral)  Resp 16  Wt 212 lb 3.2 oz (96.3 kg)  SpO2 98%  BMI 30.45 kg/m2 Estimated body mass index is 30.42 kg/(m^2) as calculated from the following:    Height as of 6/30/17: 5' 10\" (1.778 m).    Weight as of 6/30/17: 212 lb " "(96.2 kg).  Physical Exam  GENERAL: healthy, alert and no distress  NECK: no adenopathy, no asymmetry, masses, or scars and thyroid normal to palpation  RESP: lungs clear to auscultation - no rales, rhonchi or wheezes  CV: regular rate and rhythm, normal S1 S2, no S3 or S4, no murmur, click or rub, no peripheral edema and peripheral pulses strong  ABDOMEN: soft, nontender, no hepatosplenomegaly, no masses and bowel sounds normal  MS: no gross musculoskeletal defects noted, no edema  SKIN:   Number of seborrheic keratoses, otherwise benign lesions, full skin check done    EKG: appears normal, NSR, normal axis, normal intervals, no acute ST/T changes c/w ischemia, no LVH by voltage criteria, unchanged from previous tracings     ASSESSMENT / PLAN:     ASSESSMENT:   1.  Annual Wellness Visit  2.  HTN, controlled.   Some orthostatic symptoms, discussed option to decrease ACE and also plan to stay well hydrated  3.  Obesity, stable  4.  History of colonic polyps, due for recheck  5.  Mild rosacea      PLAN:  1.  Continue present medications   2.  Check basic metabolic panel --> creat 1.29, glucose 102   3.  Schedule colonoscopy.   [facility]  [phone]      End of Life Planning:  Patient currently has an advanced directive: No.  I have verified the patient's ablity to prepare an advanced directive/make health care decisions.  Literature was provided to assist patient in preparing an advanced directive.    COUNSELING:  Reviewed preventive health counseling, as reflected in patient instructions       Regular exercise    BP Readings from Last 1 Encounters:   06/30/17 124/70     Estimated body mass index is 30.42 kg/(m^2) as calculated from the following:    Height as of 6/30/17: 5' 10\" (1.778 m).    Weight as of 6/30/17: 212 lb (96.2 kg).      Weight management plan: Discussed healthy diet and exercise guidelines and patient will follow up in 12 months in clinic to re-evaluate.     reports that he has " never smoked. He has never used smokeless tobacco.      Appropriate preventive services were discussed with this patient, including applicable screening as appropriate for cardiovascular disease, diabetes, osteopenia/osteoporosis, and glaucoma.  As appropriate for age/gender, discussed screening for colorectal cancer, prostate cancer, breast cancer, and cervical cancer. Checklist reviewing preventive services available has been given to the patient.    Reviewed patients plan of care and provided an AVS. The Basic Care Plan (routine screening as documented in Health Maintenance) for Nirav meets the Care Plan requirement. This Care Plan has been established and reviewed with the Patient.    Counseling Resources:  ATP IV Guidelines  Pooled Cohorts Equation Calculator  Breast Cancer Risk Calculator  FRAX Risk Assessment  ICSI Preventive Guidelines  Dietary Guidelines for Americans, 2010  USDA's MyPlate  ASA Prophylaxis  Lung CA Screening    Montana Silver MD  [facility]

## 2018-07-14 ENCOUNTER — HEALTH MAINTENANCE LETTER (OUTPATIENT)
Age: 78
End: 2018-07-14

## 2018-07-23 ENCOUNTER — TRANSFERRED RECORDS (OUTPATIENT)
Dept: HEALTH INFORMATION MANAGEMENT | Facility: CLINIC | Age: 78
End: 2018-07-23

## 2018-08-09 DIAGNOSIS — I10 ESSENTIAL HYPERTENSION: ICD-10-CM

## 2018-08-10 RX ORDER — LISINOPRIL 10 MG/1
TABLET ORAL
Qty: 30 TABLET | Refills: 0 | OUTPATIENT
Start: 2018-08-10

## 2018-08-10 NOTE — TELEPHONE ENCOUNTER
"Requested Prescriptions   Pending Prescriptions Disp Refills     lisinopril (PRINIVIL/ZESTRIL) 10 MG tablet [Pharmacy Med Name: LISINOPRIL 10MG TABLETS] 30 tablet 0    Last Written Prescription Date:  7/13/2018  Last Fill Quantity: 90,  # refills: 3   Last office visit: 7/13/2018 with prescribing provider:  7/13/2018   Future Office Visit:     Sig: TAKE 1 TABLET BY MOUTH EVERY DAY    ACE Inhibitors (Including Combos) Protocol Failed    8/9/2018  8:15 PM       Failed - Normal serum creatinine on file in past 12 months    Recent Labs   Lab Test  07/13/18   1022   CR  1.29*            Passed - Blood pressure under 140/90 in past 12 months    BP Readings from Last 3 Encounters:   07/13/18 120/70   06/30/17 124/70   07/20/16 132/74                Passed - Recent (12 mo) or future (30 days) visit within the authorizing provider's specialty    Patient had office visit in the last 12 months or has a visit in the next 30 days with authorizing provider or within the authorizing provider's specialty.  See \"Patient Info\" tab in inbasket, or \"Choose Columns\" in Meds & Orders section of the refill encounter.           Passed - Patient is age 18 or older       Passed - Normal serum potassium on file in past 12 months    Recent Labs   Lab Test  07/13/18   1022   POTASSIUM  4.6               "

## 2018-08-10 NOTE — TELEPHONE ENCOUNTER
Patient has refills on file.  Spoke with pharmacy to verify.  They will fill for patient now.  Closing encounter.

## 2018-08-17 DIAGNOSIS — R20.2 PARESTHESIAS: ICD-10-CM

## 2018-08-17 NOTE — TELEPHONE ENCOUNTER
"Requested Prescriptions   Pending Prescriptions Disp Refills     cyanocobalamin 2500 MCG SUBL sublingual tablet [Pharmacy Med Name: VITAMIN B-12 2500MCG SUBL TABS 50'S] 90 tablet 0    Last Written Prescription Date:  5/14/18  Last Fill Quantity: 90,  # refills: 0   Last office visit: 7/13/2018 with prescribing provider:  7/13/18   Future Office Visit:     Sig: PLACE 1 TABLET UNDER THE TONGUE DAILY    Vitamin Supplements (Adult) Protocol Passed    8/17/2018  3:16 AM       Passed - High dose Vitamin D not ordered       Passed - Recent (12 mo) or future (30 days) visit within the authorizing provider's specialty    Patient had office visit in the last 12 months or has a visit in the next 30 days with authorizing provider or within the authorizing provider's specialty.  See \"Patient Info\" tab in inbasket, or \"Choose Columns\" in Meds & Orders section of the refill encounter.              "

## 2019-08-06 DIAGNOSIS — I10 ESSENTIAL HYPERTENSION: ICD-10-CM

## 2019-08-06 RX ORDER — LISINOPRIL 10 MG/1
TABLET ORAL
Qty: 30 TABLET | Refills: 0 | Status: SHIPPED | OUTPATIENT
Start: 2019-08-06

## 2019-08-06 RX ORDER — LISINOPRIL 10 MG/1
TABLET ORAL
Qty: 90 TABLET | Refills: 0 | OUTPATIENT
Start: 2019-08-06

## 2019-08-06 NOTE — TELEPHONE ENCOUNTER
"Requested Prescriptions   Pending Prescriptions Disp Refills     lisinopril (PRINIVIL/ZESTRIL) 10 MG tablet [Pharmacy Med Name: LISINOPRIL 10MG TABLETS] 90 tablet 0     Sig: TAKE 1 TABLET(10 MG) BY MOUTH DAILY       ACE Inhibitors (Including Combos) Protocol Failed - 8/6/2019  3:16 AM        Failed - Blood pressure under 140/90 in past 12 months     BP Readings from Last 3 Encounters:   07/13/18 120/70   06/30/17 124/70   07/20/16 132/74                 Failed - Recent (12 mo) or future (30 days) visit within the authorizing provider's specialty     Patient had office visit in the last 12 months or has a visit in the next 30 days with authorizing provider or within the authorizing provider's specialty.  See \"Patient Info\" tab in inbasket, or \"Choose Columns\" in Meds & Orders section of the refill encounter.              Failed - Normal serum creatinine on file in past 12 months     Recent Labs   Lab Test 07/13/18  1022   CR 1.29*             Failed - Normal serum potassium on file in past 12 months     Recent Labs   Lab Test 07/13/18  1022   POTASSIUM 4.6             Passed - Medication is active on med list        Passed - Patient is age 18 or older        Medication is being filled for 1 time refill only due to:  Patient needs to be seen because it has been more than one year since last visit.    "

## 2019-08-06 NOTE — LETTER
[facility]  [address] [id number] [phone]            Nirav Queen  [address]  [facility] [address]        August 6, 2019    Dear Nirav,    While refilling your prescription today, we noticed that you are due for an appointment with your provider.  We will refill your prescription for 30 days, but a follow-up appointment must be made before any additional refills can be approved.     Taking care of your health is important to us and we look forward to seeing you in the near future.  Please call us at [phone] or [phone] (or use [employer]) to schedule an appointment.     Please disregard this notice if you have already made an appointment.    Sincerely,        [facility]

## 2019-08-06 NOTE — TELEPHONE ENCOUNTER
"Requested Prescriptions   Pending Prescriptions Disp Refills     lisinopril (PRINIVIL/ZESTRIL) 10 MG tablet [Pharmacy Med Name: LISINOPRIL 10MG TABLETS] 90 tablet 0     Sig: TAKE 1 TABLET BY MOUTH EVERY DAY       ACE Inhibitors (Including Combos) Protocol Failed - 8/6/2019  8:23 AM        Failed - Blood pressure under 140/90 in past 12 months     BP Readings from Last 3 Encounters:   07/13/18 120/70   06/30/17 124/70   07/20/16 132/74                 Failed - Recent (12 mo) or future (30 days) visit within the authorizing provider's specialty     Patient had office visit in the last 12 months or has a visit in the next 30 days with authorizing provider or within the authorizing provider's specialty.  See \"Patient Info\" tab in inbasket, or \"Choose Columns\" in Meds & Orders section of the refill encounter.              Failed - Normal serum creatinine on file in past 12 months     Recent Labs   Lab Test 07/13/18  1022   CR 1.29*             Failed - Normal serum potassium on file in past 12 months     Recent Labs   Lab Test 07/13/18  1022   POTASSIUM 4.6             Passed - Medication is active on med list        Passed - Patient is age 18 or older          "

## 2019-10-01 ENCOUNTER — HEALTH MAINTENANCE LETTER (OUTPATIENT)
Age: 79
End: 2019-10-01

## 2019-12-15 ENCOUNTER — HEALTH MAINTENANCE LETTER (OUTPATIENT)
Age: 79
End: 2019-12-15

## 2021-01-15 ENCOUNTER — HEALTH MAINTENANCE LETTER (OUTPATIENT)
Age: 81
End: 2021-01-15

## 2021-09-04 ENCOUNTER — HEALTH MAINTENANCE LETTER (OUTPATIENT)
Age: 81
End: 2021-09-04

## 2022-02-19 ENCOUNTER — HEALTH MAINTENANCE LETTER (OUTPATIENT)
Age: 82
End: 2022-02-19

## 2022-10-16 ENCOUNTER — HEALTH MAINTENANCE LETTER (OUTPATIENT)
Age: 82
End: 2022-10-16

## 2023-04-01 ENCOUNTER — HEALTH MAINTENANCE LETTER (OUTPATIENT)
Age: 83
End: 2023-04-01

## 2025-06-23 ENCOUNTER — APPOINTMENT (OUTPATIENT)
Dept: CT IMAGING | Facility: CLINIC | Age: 85
End: 2025-06-23
Attending: EMERGENCY MEDICINE
Payer: COMMERCIAL

## 2025-06-23 ENCOUNTER — HOSPITAL ENCOUNTER (EMERGENCY)
Facility: CLINIC | Age: 85
Discharge: HOME | End: 2025-06-23
Attending: EMERGENCY MEDICINE
Payer: COMMERCIAL

## 2025-06-23 ENCOUNTER — APPOINTMENT (OUTPATIENT)
Dept: GENERAL RADIOLOGY | Facility: CLINIC | Age: 85
End: 2025-06-23
Attending: EMERGENCY MEDICINE
Payer: COMMERCIAL

## 2025-06-23 ENCOUNTER — OFFICE VISIT (OUTPATIENT)
Dept: URGENT CARE | Facility: URGENT CARE | Age: 85
End: 2025-06-23
Payer: COMMERCIAL

## 2025-06-23 VITALS
SYSTOLIC BLOOD PRESSURE: 159 MMHG | DIASTOLIC BLOOD PRESSURE: 79 MMHG | HEART RATE: 92 BPM | OXYGEN SATURATION: 96 % | TEMPERATURE: 97.6 F | HEIGHT: 69 IN | RESPIRATION RATE: 18 BRPM | BODY MASS INDEX: 31.15 KG/M2 | WEIGHT: 210.3 LBS

## 2025-06-23 VITALS
TEMPERATURE: 98.1 F | OXYGEN SATURATION: 98 % | SYSTOLIC BLOOD PRESSURE: 135 MMHG | DIASTOLIC BLOOD PRESSURE: 73 MMHG | BODY MASS INDEX: 31.61 KG/M2 | WEIGHT: 210.98 LBS | RESPIRATION RATE: 18 BRPM | HEART RATE: 73 BPM

## 2025-06-23 DIAGNOSIS — I10 ESSENTIAL HYPERTENSION: ICD-10-CM

## 2025-06-23 DIAGNOSIS — R07.9 NONSPECIFIC CHEST PAIN: ICD-10-CM

## 2025-06-23 DIAGNOSIS — E78.5 HYPERLIPIDEMIA LDL GOAL <130: ICD-10-CM

## 2025-06-23 DIAGNOSIS — R07.9 ACUTE CHEST PAIN: Primary | ICD-10-CM

## 2025-06-23 DIAGNOSIS — J18.9 COMMUNITY ACQUIRED PNEUMONIA OF LEFT LOWER LOBE OF LUNG: ICD-10-CM

## 2025-06-23 LAB
ALBUMIN SERPL BCG-MCNC: 3.9 G/DL (ref 3.5–5.2)
ALP SERPL-CCNC: 131 U/L (ref 40–150)
ALT SERPL W P-5'-P-CCNC: 14 U/L (ref 0–70)
ANION GAP SERPL CALCULATED.3IONS-SCNC: 13 MMOL/L (ref 7–15)
AST SERPL W P-5'-P-CCNC: 17 U/L (ref 0–45)
ATRIAL RATE - MUSE: 77 BPM
BASOPHILS # BLD AUTO: 0.1 10E3/UL (ref 0–0.2)
BASOPHILS NFR BLD AUTO: 1 %
BILIRUB DIRECT SERPL-MCNC: 0.14 MG/DL (ref 0–0.3)
BILIRUB SERPL-MCNC: 0.4 MG/DL
BUN SERPL-MCNC: 18.5 MG/DL (ref 8–23)
CALCIUM SERPL-MCNC: 8.9 MG/DL (ref 8.8–10.4)
CHLORIDE SERPL-SCNC: 108 MMOL/L (ref 98–107)
CREAT SERPL-MCNC: 1.54 MG/DL (ref 0.67–1.17)
D DIMER PPP FEU-MCNC: 0.62 UG/ML FEU (ref 0–0.5)
DIASTOLIC BLOOD PRESSURE - MUSE: NORMAL MMHG
EGFRCR SERPLBLD CKD-EPI 2021: 44 ML/MIN/1.73M2
EOSINOPHIL # BLD AUTO: 0.3 10E3/UL (ref 0–0.7)
EOSINOPHIL NFR BLD AUTO: 4 %
ERYTHROCYTE [DISTWIDTH] IN BLOOD BY AUTOMATED COUNT: 13.4 % (ref 10–15)
GLUCOSE SERPL-MCNC: 111 MG/DL (ref 70–99)
HCO3 SERPL-SCNC: 20 MMOL/L (ref 22–29)
HCT VFR BLD AUTO: 42.6 % (ref 40–53)
HGB BLD-MCNC: 14.3 G/DL (ref 13.3–17.7)
HOLD SPECIMEN: NORMAL
HOLD SPECIMEN: NORMAL
IMM GRANULOCYTES # BLD: 0 10E3/UL
IMM GRANULOCYTES NFR BLD: 1 %
INTERPRETATION ECG - MUSE: NORMAL
LYMPHOCYTES # BLD AUTO: 0.9 10E3/UL (ref 0.8–5.3)
LYMPHOCYTES NFR BLD AUTO: 10 %
MCH RBC QN AUTO: 29.9 PG (ref 26.5–33)
MCHC RBC AUTO-ENTMCNC: 33.6 G/DL (ref 31.5–36.5)
MCV RBC AUTO: 89 FL (ref 78–100)
MONOCYTES # BLD AUTO: 1.2 10E3/UL (ref 0–1.3)
MONOCYTES NFR BLD AUTO: 14 %
NEUTROPHILS # BLD AUTO: 6.1 10E3/UL (ref 1.6–8.3)
NEUTROPHILS NFR BLD AUTO: 71 %
NRBC # BLD AUTO: 0 10E3/UL
NRBC BLD AUTO-RTO: 0 /100
P AXIS - MUSE: 55 DEGREES
PLATELET # BLD AUTO: 253 10E3/UL (ref 150–450)
POTASSIUM SERPL-SCNC: 4.2 MMOL/L (ref 3.4–5.3)
PR INTERVAL - MUSE: 238 MS
PROT SERPL-MCNC: 7.1 G/DL (ref 6.4–8.3)
QRS DURATION - MUSE: 88 MS
QT - MUSE: 368 MS
QTC - MUSE: 416 MS
R AXIS - MUSE: -30 DEGREES
RBC # BLD AUTO: 4.79 10E6/UL (ref 4.4–5.9)
SODIUM SERPL-SCNC: 141 MMOL/L (ref 135–145)
SYSTOLIC BLOOD PRESSURE - MUSE: NORMAL MMHG
T AXIS - MUSE: 52 DEGREES
TROPONIN T SERPL HS-MCNC: 12 NG/L
TROPONIN T SERPL HS-MCNC: 13 NG/L
VENTRICULAR RATE- MUSE: 77 BPM
WBC # BLD AUTO: 8.5 10E3/UL (ref 4–11)

## 2025-06-23 PROCEDURE — 85004 AUTOMATED DIFF WBC COUNT: CPT | Performed by: EMERGENCY MEDICINE

## 2025-06-23 PROCEDURE — 96361 HYDRATE IV INFUSION ADD-ON: CPT

## 2025-06-23 PROCEDURE — 71046 X-RAY EXAM CHEST 2 VIEWS: CPT

## 2025-06-23 PROCEDURE — 84484 ASSAY OF TROPONIN QUANT: CPT | Performed by: EMERGENCY MEDICINE

## 2025-06-23 PROCEDURE — 36415 COLL VENOUS BLD VENIPUNCTURE: CPT | Performed by: EMERGENCY MEDICINE

## 2025-06-23 PROCEDURE — 93000 ELECTROCARDIOGRAM COMPLETE: CPT | Performed by: FAMILY MEDICINE

## 2025-06-23 PROCEDURE — 258N000003 HC RX IP 258 OP 636: Performed by: EMERGENCY MEDICINE

## 2025-06-23 PROCEDURE — 96360 HYDRATION IV INFUSION INIT: CPT | Mod: 59

## 2025-06-23 PROCEDURE — 71275 CT ANGIOGRAPHY CHEST: CPT

## 2025-06-23 PROCEDURE — 250N000009 HC RX 250: Performed by: EMERGENCY MEDICINE

## 2025-06-23 PROCEDURE — 82248 BILIRUBIN DIRECT: CPT | Performed by: EMERGENCY MEDICINE

## 2025-06-23 PROCEDURE — 99285 EMERGENCY DEPT VISIT HI MDM: CPT | Mod: 25

## 2025-06-23 PROCEDURE — 93005 ELECTROCARDIOGRAM TRACING: CPT

## 2025-06-23 PROCEDURE — 3077F SYST BP >= 140 MM HG: CPT | Performed by: FAMILY MEDICINE

## 2025-06-23 PROCEDURE — 85379 FIBRIN DEGRADATION QUANT: CPT | Performed by: EMERGENCY MEDICINE

## 2025-06-23 PROCEDURE — 80048 BASIC METABOLIC PNL TOTAL CA: CPT | Performed by: EMERGENCY MEDICINE

## 2025-06-23 PROCEDURE — 99204 OFFICE O/P NEW MOD 45 MIN: CPT | Performed by: FAMILY MEDICINE

## 2025-06-23 PROCEDURE — 3078F DIAST BP <80 MM HG: CPT | Performed by: FAMILY MEDICINE

## 2025-06-23 PROCEDURE — 250N000011 HC RX IP 250 OP 636: Performed by: EMERGENCY MEDICINE

## 2025-06-23 RX ORDER — DOXYCYCLINE 100 MG/1
100 CAPSULE ORAL 2 TIMES DAILY
Qty: 14 CAPSULE | Refills: 0 | Status: SHIPPED | OUTPATIENT
Start: 2025-06-23 | End: 2025-06-30

## 2025-06-23 RX ORDER — ATORVASTATIN CALCIUM 10 MG/1
10 TABLET, FILM COATED ORAL DAILY
COMMUNITY
Start: 2024-12-27

## 2025-06-23 RX ORDER — IOPAMIDOL 755 MG/ML
500 INJECTION, SOLUTION INTRAVASCULAR ONCE
Status: COMPLETED | OUTPATIENT
Start: 2025-06-23 | End: 2025-06-23

## 2025-06-23 RX ADMIN — IOPAMIDOL 85 ML: 755 INJECTION, SOLUTION INTRAVENOUS at 20:14

## 2025-06-23 RX ADMIN — SODIUM CHLORIDE 100 ML: 9 INJECTION, SOLUTION INTRAVENOUS at 20:14

## 2025-06-23 RX ADMIN — SODIUM CHLORIDE 1000 ML: 0.9 INJECTION, SOLUTION INTRAVENOUS at 18:11

## 2025-06-23 ASSESSMENT — ACTIVITIES OF DAILY LIVING (ADL)
ADLS_ACUITY_SCORE: 41

## 2025-06-23 ASSESSMENT — COLUMBIA-SUICIDE SEVERITY RATING SCALE - C-SSRS
6. HAVE YOU EVER DONE ANYTHING, STARTED TO DO ANYTHING, OR PREPARED TO DO ANYTHING TO END YOUR LIFE?: NO
1. IN THE PAST MONTH, HAVE YOU WISHED YOU WERE DEAD OR WISHED YOU COULD GO TO SLEEP AND NOT WAKE UP?: NO
2. HAVE YOU ACTUALLY HAD ANY THOUGHTS OF KILLING YOURSELF IN THE PAST MONTH?: NO

## 2025-06-23 NOTE — ED PROVIDER NOTES
Emergency Department Note      History of Present Illness     Chief Complaint   Chest Pain      HPI   Nirav Queen is a 85 year old male with history of hypertension and hyperlipidemia who presents to the ED with his wife and daughter for evaluation of chest pain. The patient reports that he [place] the [place] when he suddenly developed right-sided chest pain. The pain radiates to his back and slightly to his jaw. Patient states that his pain lasted about 20 minutes and describes it as achy/stabbing. He reports that occasionally when he sleeps on his right side, he will wake up with similar right-sided chest pain but it relieves as soon as he sits up. He states that he is active and does yoga 4-5 times per week but never gets chest pain during those sessions.     Patient notes that has been dealing with a bad cough recently. He also notes that he had a recent drive to [place], [place] and [facility]. Patient denies any personal history of heart issues or blood clots but notes that both of his parents  of a heart attack. Denies shortness of breath, nausea, vomiting, leg swelling, or diaphoresis. Denies fever, abdominal pain, diarrhea, or bloody stools. No history of smoking. No recent hospitalizations or surgeries. Of note, the patient has been compliant with his anti-hypertensive medications and states that he normally runs at 130/72.     Independent Historian   None    Review of External Notes   I reviewed the urgent care visit from earlier today prior to the patient being sent to the ER.    Past Medical History     Medical History and Problem List   Diverticulosis of colon  Hypertension  Hyperlipidemia  Stage 3a CKD    Medications   Aspirin 81 mg  Atorvastatin  Lisinopril    Surgical History   Tonsillectomy    Physical Exam     Patient Vitals for the past 24 hrs:   BP Temp Temp src Pulse Resp SpO2 Weight   25 1911 -- -- -- 73 -- 98 % --   25 1856 135/73 -- -- -- -- -- --    06/23/25 1743 (!) 156/91 -- -- 75 -- 96 % --   06/23/25 1441 125/88 98.1  F (36.7  C) Temporal 90 18 98 % 95.7 kg (210 lb 15.7 oz)     Physical Exam  Vitals and nursing note reviewed.   Constitutional:       General: He is not in acute distress.     Appearance: He is not ill-appearing.   HENT:      Head: Normocephalic and atraumatic.      Right Ear: External ear normal.      Left Ear: External ear normal.      Nose: Nose normal.   Eyes:      Conjunctiva/sclera: Conjunctivae normal.   Cardiovascular:      Rate and Rhythm: Normal rate and regular rhythm.      Pulses: Normal pulses.      Heart sounds: No murmur heard.  Pulmonary:      Effort: Pulmonary effort is normal. No respiratory distress.      Breath sounds: No wheezing, rhonchi or rales.   Abdominal:      General: Abdomen is flat. There is no distension.      Palpations: Abdomen is soft.      Tenderness: There is no abdominal tenderness. There is no guarding or rebound.   Musculoskeletal:         General: No swelling or deformity.      Cervical back: Normal range of motion and neck supple.   Skin:     General: Skin is warm and dry.      Findings: No rash.   Neurological:      Mental Status: He is alert and oriented to person, place, and time.   Psychiatric:         Behavior: Behavior normal.           Diagnostics     Lab Results   Labs Ordered and Resulted from Time of ED Arrival to Time of ED Departure   BASIC METABOLIC PANEL - Abnormal       Result Value    Sodium 141      Potassium 4.2      Chloride 108 (*)     Carbon Dioxide (CO2) 20 (*)     Anion Gap 13      Urea Nitrogen 18.5      Creatinine 1.54 (*)     GFR Estimate 44 (*)     Calcium 8.9      Glucose 111 (*)    D DIMER QUANTITATIVE - Abnormal    D-Dimer Quantitative 0.62 (*)    TROPONIN T, HIGH SENSITIVITY - Normal    Troponin T, High Sensitivity 13     TROPONIN T, HIGH SENSITIVITY - Normal    Troponin T, High Sensitivity 12     HEPATIC FUNCTION PANEL - Normal    Protein Total 7.1      Albumin 3.9       Bilirubin Total 0.4      Alkaline Phosphatase 131      AST 17      ALT 14      Bilirubin Direct 0.14     CBC WITH PLATELETS AND DIFFERENTIAL    WBC Count 8.5      RBC Count 4.79      Hemoglobin 14.3      Hematocrit 42.6      MCV 89      MCH 29.9      MCHC 33.6      RDW 13.4      Platelet Count 253      % Neutrophils 71      % Lymphocytes 10      % Monocytes 14      % Eosinophils 4      % Basophils 1      % Immature Granulocytes 1      NRBCs per 100 WBC 0      Absolute Neutrophils 6.1      Absolute Lymphocytes 0.9      Absolute Monocytes 1.2      Absolute Eosinophils 0.3      Absolute Basophils 0.1      Absolute Immature Granulocytes 0.0      Absolute NRBCs 0.0         Imaging   CT Chest Pulmonary Embolism w Contrast   Final Result   IMPRESSION:   1.  Likely left lower lobe pneumonia without associated pleural effusion.   2.  Prominent but subcentimeter left hilar lymph nodes which could at least partially correspond to findings on same day chest radiograph, most likely reactive, no specific follow-up recommended.   3.  No pulmonary embolus.      XR Chest 2 Views   Final Result   IMPRESSION: Heart size and pulmonary vascularity normal. Prominent left hilum may represent a dilated left pulmonary artery versus adenopathy. As there are no prior studies for comparison recommend a CT chest exam for further evaluation. Calcified    granuloma right upper lobe. No acute infiltrates or effusions. Degenerative changes lower thoracic spine.          EKG   ECG taken at 14:45, ECG read at 14:48  Sinus rhythm with 1st degree AV block  Left axis deviation  Possible lateral infarct, age undetermined   No significant change as compared to prior, dated 7/13/18.  Rate 77 bpm. ND interval 238 ms. QRS duration 88 ms. QT/QTc 368/416 ms. P-R-T axes 55 -30 52.    Independent Interpretation   CXR: No pneumothorax or infiltrate.    ED Course      Medications Administered   Medications   sodium chloride 0.9% BOLUS 1,000 mL (0 mLs Intravenous  Stopped 6/23/25 2203)   sodium chloride 0.9 % bag for CT scan flush (100 mLs Intravenous $Given 6/23/25 2014)   iopamidol (ISOVUE-370) solution 500 mL (85 mLs Intravenous $Given 6/23/25 2014)       Procedures   None     Discussion of Management   None    ED Course   ED Course as of 06/23/25 2252   Mon Jun 23, 2025   6938 I obtained history and examined the patient as noted above.    2014 I rechecked and updated the patient.        Additional Documentation  None    Medical Decision Making / Diagnosis     CMS Diagnoses: None    MIPS   CT for PE was ordered because the patient had an abnormal d-dimer.    DHIRAJ Queen is a 85 year old male who presents with an episode of chest pain prior to arrival.  This occurred at rest and did not have any worrisome associated symptoms.  Certainly could have been ACS, though would be somewhat atypical.  His EKG on arrival shows no ischemic changes and his troponin is normal x 2.  D-dimer was sent and below age-adjusted cutoff.  Chest x-ray was performed and does show an abnormal left hilum which could be dilated pulmonary artery versus lymphadenopathy.  Given the slightly elevated D-dimer with chest pain and a possibly dilated pulmonary artery, PE still a possibility so we did perform a CT of the chest and fortunately this shows no PE but does show a pneumonia with associated lymphadenopathy.  We will start patient on antibiotics for his pneumonia and recommend close follow-up with his primary care provider.  We discussed return precautions.    Disposition   The patient was discharged.     Diagnosis     ICD-10-CM    1. Community acquired pneumonia of left lower lobe of lung  J18.9       2. Nonspecific chest pain  R07.9            Discharge Medications   Discharge Medication List as of 6/23/2025 10:03 PM        START taking these medications    Details   amoxicillin-clavulanate (AUGMENTIN) 875-125 MG tablet Take 1 tablet by mouth 2 times daily for 7 days., Disp-14 tablet,  R-0, E-Prescribe      doxycycline hyclate (VIBRAMYCIN) 100 MG capsule Take 1 capsule (100 mg) by mouth 2 times daily for 7 days., Disp-14 capsule, R-0, E-Prescribe             Scribe Disclosure:  I, Baldo Hampton, am serving as a scribe at 6:09 PM on 6/23/2025 to document services personally performed by Cliff Simpson MD based on my observations and the provider's statements to me.        Cliff Simpson MD  06/23/25 5053

## 2025-06-23 NOTE — ED TRIAGE NOTES
Patient c/o chest pain. Started about 1:30 pm, lasted about 25 simon. Went to  and sent here. Chest pain has diminished. ABCS intact. VSS.      Triage Assessment (Adult)       Row Name 06/23/25 1440          Triage Assessment    Airway WDL WDL        Respiratory WDL    Respiratory WDL WDL        Skin Circulation/Temperature WDL    Skin Circulation/Temperature WDL WDL        Cardiac WDL    Cardiac WDL X;chest pain        Peripheral/Neurovascular WDL    Peripheral Neurovascular WDL WDL        Cognitive/Neuro/Behavioral WDL    Cognitive/Neuro/Behavioral WDL WDL

## 2025-06-23 NOTE — PROGRESS NOTES
"SUBJECTIVE: Nirav Queen is a 85 year old male presenting with a chief complaint of CP into back and up neck.  Onset of symptoms was 45 mins ago lasting about 30 mins, none now.  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    Levofloxacin      muscle aching in legs     Social History     Tobacco Use    Smoking status: Never    Smokeless tobacco: Never   Substance Use Topics    Alcohol use: Yes     Comment: wine-- 1-2 glasses a week or less       ROS:  SKIN: no rash  GI: no vomiting    OBJECTIVE:  BP (!) 159/79 (BP Location: Right arm, Patient Position: Sitting, Cuff Size: Adult Regular)   Pulse 92   Temp 97.6  F (36.4  C) (Oral)   Resp 18   Ht 1.74 m (5' 8.5\")   Wt 95.4 kg (210 lb 4.8 oz)   SpO2 96%   BMI 31.51 kg/m  GENERAL APPEARANCE: healthy, alert and no distress  EYES: EOMI,  PERRL, conjunctiva clear  RESP: lungs clear to auscultation - no rales, rhonchi or wheezes  CV: regular rates and rhythm, normal S1 S2, no murmur noted  SKIN: no suspicious lesions or rashes    EKG  Sinus Rhythm -First degree A-V block   Jimena = 224  BORDERLINE RHYTHM      ICD-10-CM    1. Acute chest pain  R07.9 EKG 12-lead complete w/read - Clinics      2. Essential hypertension  I10       3. Hyperlipidemia LDL goal <130  E78.5         Pt will go through ED for w/up of Chest Pain into back and up neck       "

## 2025-07-05 ENCOUNTER — HEALTH MAINTENANCE LETTER (OUTPATIENT)
Age: 85
End: 2025-07-05